# Patient Record
Sex: FEMALE | Race: AMERICAN INDIAN OR ALASKA NATIVE
[De-identification: names, ages, dates, MRNs, and addresses within clinical notes are randomized per-mention and may not be internally consistent; named-entity substitution may affect disease eponyms.]

---

## 2017-09-04 ENCOUNTER — HOSPITAL ENCOUNTER (EMERGENCY)
Dept: HOSPITAL 5 - ED | Age: 52
LOS: 1 days | Discharge: HOME | End: 2017-09-05
Payer: MEDICAID

## 2017-09-04 DIAGNOSIS — M54.2: ICD-10-CM
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
## 2017-09-04 DIAGNOSIS — Z88.6: ICD-10-CM
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
## 2017-09-04 DIAGNOSIS — M62.830: Primary | ICD-10-CM

## 2017-09-04 DIAGNOSIS — M25.562: ICD-10-CM

## 2017-09-04 DIAGNOSIS — I10: ICD-10-CM

## 2017-09-04 PROCEDURE — 99282 EMERGENCY DEPT VISIT SF MDM: CPT

## 2017-09-05 VITALS — DIASTOLIC BLOOD PRESSURE: 80 MMHG | SYSTOLIC BLOOD PRESSURE: 139 MMHG
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
## 2018-02-15 ENCOUNTER — HOSPITAL ENCOUNTER (INPATIENT)
Dept: HOSPITAL 5 - ED | Age: 53
LOS: 2 days | Discharge: PSYCHIATRIC OR SUBSTANCE USE FACILITY | DRG: 917 | End: 2018-02-17
Attending: INTERNAL MEDICINE | Admitting: INTERNAL MEDICINE
Payer: MEDICAID

## 2018-02-15 DIAGNOSIS — E87.6: ICD-10-CM

## 2018-02-15 DIAGNOSIS — T42.8X2A: Primary | ICD-10-CM

## 2018-02-15 DIAGNOSIS — T43.592A: ICD-10-CM

## 2018-02-15 DIAGNOSIS — F20.9: ICD-10-CM

## 2018-02-15 DIAGNOSIS — I10: ICD-10-CM

## 2018-02-15 DIAGNOSIS — Z90.49: ICD-10-CM

## 2018-02-15 DIAGNOSIS — J96.00: ICD-10-CM

## 2018-02-15 DIAGNOSIS — R40.1: ICD-10-CM

## 2018-02-15 DIAGNOSIS — Z88.6: ICD-10-CM

## 2018-02-15 LAB
ALBUMIN SERPL-MCNC: 3.5 G/DL (ref 3.9–5)
ALT SERPL-CCNC: 26 UNITS/L (ref 7–56)
APTT BLD: 30.5 SEC. (ref 24.2–36.6)
BASOPHILS # (AUTO): 0 K/MM3 (ref 0–0.1)
BASOPHILS NFR BLD AUTO: 0.8 % (ref 0–1.8)
BENZODIAZEPINES SCREEN,URINE: (no result)
BILIRUB UR QL STRIP: (no result)
BLOOD UR QL VISUAL: (no result)
BUN SERPL-MCNC: 13 MG/DL (ref 7–17)
BUN/CREAT SERPL: 26 %
CALCIUM SERPL-MCNC: 7.6 MG/DL (ref 8.4–10.2)
EOSINOPHIL # BLD AUTO: 0.2 K/MM3 (ref 0–0.4)
EOSINOPHIL NFR BLD AUTO: 4.3 % (ref 0–4.3)
HCT VFR BLD CALC: 32 % (ref 30.3–42.9)
HEMOLYSIS INDEX: 28
HGB BLD-MCNC: 10.9 GM/DL (ref 10.1–14.3)
INR PPP: 0.95 (ref 0.87–1.13)
LYMPHOCYTES # BLD AUTO: 1.8 K/MM3 (ref 1.2–5.4)
LYMPHOCYTES NFR BLD AUTO: 46.5 % (ref 13.4–35)
MAGNESIUM SERPL-MCNC: 2.1 MG/DL (ref 1.7–2.3)
MCH RBC QN AUTO: 33 PG (ref 28–32)
MCHC RBC AUTO-ENTMCNC: 34 % (ref 30–34)
MCV RBC AUTO: 98 FL (ref 79–97)
METHADONE SCREEN,URINE: (no result)
MONOCYTES # (AUTO): 0.5 K/MM3 (ref 0–0.8)
MONOCYTES % (AUTO): 12.2 % (ref 0–7.3)
NITRITE UR QL STRIP: (no result)
OPIATE SCREEN,URINE: (no result)
PH UR STRIP: 7 [PH] (ref 5–7)
PLATELET # BLD: 161 K/MM3 (ref 140–440)
PROT UR STRIP-MCNC: (no result) MG/DL
RBC # BLD AUTO: 3.27 M/MM3 (ref 3.65–5.03)
RBC #/AREA URNS HPF: 1 /HPF (ref 0–6)
UROBILINOGEN UR-MCNC: < 2 MG/DL (ref ?–2)
WBC #/AREA URNS HPF: < 1 /HPF (ref 0–6)

## 2018-02-15 PROCEDURE — 80048 BASIC METABOLIC PNL TOTAL CA: CPT

## 2018-02-15 PROCEDURE — 82550 ASSAY OF CK (CPK): CPT

## 2018-02-15 PROCEDURE — 87205 SMEAR GRAM STAIN: CPT

## 2018-02-15 PROCEDURE — 82553 CREATINE MB FRACTION: CPT

## 2018-02-15 PROCEDURE — 94002 VENT MGMT INPAT INIT DAY: CPT

## 2018-02-15 PROCEDURE — 4A033R1 MEASUREMENT OF ARTERIAL SATURATION, PERIPHERAL, PERCUTANEOUS APPROACH: ICD-10-PCS | Performed by: INTERNAL MEDICINE

## 2018-02-15 PROCEDURE — 93010 ELECTROCARDIOGRAM REPORT: CPT

## 2018-02-15 PROCEDURE — 70450 CT HEAD/BRAIN W/O DYE: CPT

## 2018-02-15 PROCEDURE — 80307 DRUG TEST PRSMV CHEM ANLYZR: CPT

## 2018-02-15 PROCEDURE — 87070 CULTURE OTHR SPECIMN AEROBIC: CPT

## 2018-02-15 PROCEDURE — 71045 X-RAY EXAM CHEST 1 VIEW: CPT

## 2018-02-15 PROCEDURE — 0BH17EZ INSERTION OF ENDOTRACHEAL AIRWAY INTO TRACHEA, VIA NATURAL OR ARTIFICIAL OPENING: ICD-10-PCS | Performed by: INTERNAL MEDICINE

## 2018-02-15 PROCEDURE — 85025 COMPLETE CBC W/AUTO DIFF WBC: CPT

## 2018-02-15 PROCEDURE — 93005 ELECTROCARDIOGRAM TRACING: CPT

## 2018-02-15 PROCEDURE — 81001 URINALYSIS AUTO W/SCOPE: CPT

## 2018-02-15 PROCEDURE — 82962 GLUCOSE BLOOD TEST: CPT

## 2018-02-15 PROCEDURE — 84100 ASSAY OF PHOSPHORUS: CPT

## 2018-02-15 PROCEDURE — 5A1935Z RESPIRATORY VENTILATION, LESS THAN 24 CONSECUTIVE HOURS: ICD-10-PCS | Performed by: INTERNAL MEDICINE

## 2018-02-15 PROCEDURE — 85730 THROMBOPLASTIN TIME PARTIAL: CPT

## 2018-02-15 PROCEDURE — 36600 WITHDRAWAL OF ARTERIAL BLOOD: CPT

## 2018-02-15 PROCEDURE — 94003 VENT MGMT INPAT SUBQ DAY: CPT

## 2018-02-15 PROCEDURE — 83036 HEMOGLOBIN GLYCOSYLATED A1C: CPT

## 2018-02-15 PROCEDURE — 96361 HYDRATE IV INFUSION ADD-ON: CPT

## 2018-02-15 PROCEDURE — 36415 COLL VENOUS BLD VENIPUNCTURE: CPT

## 2018-02-15 PROCEDURE — 83735 ASSAY OF MAGNESIUM: CPT

## 2018-02-15 PROCEDURE — 80053 COMPREHEN METABOLIC PANEL: CPT

## 2018-02-15 PROCEDURE — 85610 PROTHROMBIN TIME: CPT

## 2018-02-15 PROCEDURE — 84484 ASSAY OF TROPONIN QUANT: CPT

## 2018-02-15 PROCEDURE — 94640 AIRWAY INHALATION TREATMENT: CPT

## 2018-02-15 PROCEDURE — 96375 TX/PRO/DX INJ NEW DRUG ADDON: CPT

## 2018-02-15 PROCEDURE — G0480 DRUG TEST DEF 1-7 CLASSES: HCPCS

## 2018-02-15 PROCEDURE — 96374 THER/PROPH/DIAG INJ IV PUSH: CPT

## 2018-02-15 PROCEDURE — 80320 DRUG SCREEN QUANTALCOHOLS: CPT

## 2018-02-15 PROCEDURE — 82803 BLOOD GASES ANY COMBINATION: CPT

## 2018-02-15 NOTE — EMERGENCY DEPARTMENT REPORT
ED Psych HPI





- General


Stated Complaint: MH EVAL


Time Seen by Provider: 02/15/18 13:05


Source: EMS


Mode of arrival: Stretcher


Limitations: Altered Mental Status





- History of Present Illness


Initial Comments: 





52-year-old female with a past medical history of hypertension and 

schizophrenia presents to the hospital with intentional overdose.  EMS reports 

that patient was speaking to them upon arrival.  She states around noon she 

took a handful of Zanaflex and Seroquel.  Exact number of tablets unknown.  

Medication bottles available in the ED.  Seroquel 200 mg tablets filled 2/15 30 

tab now has 57 tabs in bottle. Zanaflex 4mg filled 2/6 30 tab now has 2 tablets 

in bottle pt should have about 20-21 tabs left in bottle.  Patient became more 

sedated a route to the hospital as per EMS despite receiving Narcan 2 mg.  

Patient also hypotensive.  IV fluids initiated prior to arrival.





Daughter arrives to the ED after intubation and states that her mother wanted 

her to cosign for a loan.  When her daughter refused she stated "I'm not going 

to need a loan where I am going" she took pills and informed her family member 

to call the ambulance if she does not wake up in one hour.  They called after 

she informed them that she took the pills.  He reported there were also pills 

on the floor in the home





- Related Data


 Home Medications











 Medication  Instructions  Recorded  Confirmed  Last Taken


 


Gabapentin [Neurontin] 300 mg PO Q8HR 02/15/18 02/15/18 Unknown


 


Losartan/Hydrochlorothiazide 1 each PO DAILY 02/15/18 02/15/18 Unknown





[Losartan-Hctz 50-12.5 mg Tab]    


 


QUEtiapine [SEROquel] 200 mg PO DAILY 02/15/18 02/15/18 Unknown


 


amLODIPine [Norvasc] 5 mg PO DAILY 02/15/18 02/15/18 Unknown


 


tiZANidine [Zanaflex] 4 mg PO DAILY 02/15/18 02/15/18 Unknown











 Allergies











Allergy/AdvReac Type Severity Reaction Status Date / Time


 


hydrocodone Allergy  Itching Verified 09/04/17 21:47














ED Review of Systems


ROS: 


Stated complaint: MH EVAL


Other details as noted in HPI








ED Past Medical Hx





- Past Medical History


Hx Hypertension: Yes





- Surgical History


Hx Cholecystectomy: Yes


Additional Surgical History: UFE,





- Social History


Smoking Status: Never Smoker


Substance Use Type: None





- Medications


Home Medications: 


 Home Medications











 Medication  Instructions  Recorded  Confirmed  Last Taken  Type


 


Gabapentin [Neurontin] 300 mg PO Q8HR 02/15/18 02/15/18 Unknown History


 


Losartan/Hydrochlorothiazide 1 each PO DAILY 02/15/18 02/15/18 Unknown History





[Losartan-Hctz 50-12.5 mg Tab]     


 


QUEtiapine [SEROquel] 200 mg PO DAILY 02/15/18 02/15/18 Unknown History


 


amLODIPine [Norvasc] 5 mg PO DAILY 02/15/18 02/15/18 Unknown History


 


tiZANidine [Zanaflex] 4 mg PO DAILY 02/15/18 02/15/18 Unknown History














ED Physical Exam





- Other


Other exam information: 





General: Limited by current mental status


Head exam: Atraumatic, normocephalic


Eyes exam: Pupils pinpoint and reactive


ENT: Moist mucous membrane


Neck exam: Normal inspection


Respiratory exam: Clear to auscultation bilateral, no wheezes, rales, crackles


Cardiovascular: Normal rate and rhythm


Abdomen: Soft, nondistended, and  nontender


Extremity: Full range of motion normal inspection no deformity


Back: Normal Inspection, full range of motion, no tenderness


Neurologic: Lethargic, no localization of withdrawal pain.  Patient does not 

open eyes to pain or voice.  No spontaneous movement.  With a GCS less than 8 

seconds intubation is warranted


Psychiatric: Unresponsive


Skin: Warm, dry, intact





ED Course


 Vital Signs











  02/15/18 02/15/18 02/15/18





  13:05 13:08 13:15


 


Temperature 98.1 F  


 


Pulse Rate 78 78 77


 


Respiratory  12 11 L





Rate   


 


Blood Pressure 89/54 85/49 91/53


 


O2 Sat by Pulse 100 99 100





Oximetry   














  02/15/18 02/15/18 02/15/18





  13:30 13:45 14:00


 


Temperature   


 


Pulse Rate 84 116 H 92 H


 


Respiratory 16 16 16





Rate   


 


Blood Pressure 106/63 107/59 99/61


 


O2 Sat by Pulse 100  





Oximetry   














  02/15/18 02/15/18 02/15/18





  14:15 14:30 14:45


 


Temperature   


 


Pulse Rate 104 H 94 H 90


 


Respiratory 16 16 16





Rate   


 


Blood Pressure 104/64 107/66 109/74


 


O2 Sat by Pulse 100 100 100





Oximetry   














  02/15/18 02/15/18 02/15/18





  15:00 15:15 15:30


 


Temperature   


 


Pulse Rate 88 90 117 H


 


Respiratory 16 16 16





Rate   


 


Blood Pressure 109/72 118/75 101/61


 


O2 Sat by Pulse  100 100





Oximetry   














  02/15/18 02/15/18 02/15/18





  15:45 16:00 16:15


 


Temperature   


 


Pulse Rate 95 H 107 H 99 H


 


Respiratory 16 16 16





Rate   


 


Blood Pressure 118/67 114/58 120/60


 


O2 Sat by Pulse 100 100 100





Oximetry   














  02/15/18 02/15/18





  16:30 16:53


 


Temperature  


 


Pulse Rate 99 H 110 H


 


Respiratory 16 16





Rate  


 


Blood Pressure 121/64 107/57


 


O2 Sat by Pulse  100





Oximetry  














- Reevaluation(s)


Reevaluation #1: 





02/15/18 18:02


Blood pressure remained stable after IV fluid bolus


Pt intubated without IV sedation and remains sedated at this time








- Consultations


Consultation #1: 





02/15/18 13:45


case d/w [place] with poison control


Seroquel can cause seizures recommended benzos as needed


Zanaflex can cause hypotension and respiratory depression.


Symptomatic treatment advised


Avoid antipsychotic (Haldol/Geodon)


Recommended 4 hour postingestion aspirin and Tylenol











- Intubation


Time Out Performed: Yes


Sedative: Etomidate


Paralytic: Succinylcholine


Laryngoscope: Esthela


Size: 3


ET Tube Size: 7.5


Tube Secured Depth (cm): 23


Tube Secured Location: lips


Tube Placement Confirmation: visualized tube passing t, equal breath sounds 

bilat, no breath sounds over epi, confirmation by capnometr


Patient Tolerated Procedure: well


Intubation Complications: none





ED Medical Decision Making





- Lab Data


Result diagrams: 


 02/15/18 13:50





 02/15/18 13:50








 Lab Results











  02/15/18 02/15/18 02/15/18 Range/Units





  13:48 13:48 13:50 


 


WBC    3.9 L  (4.5-11.0)  K/mm3


 


RBC    3.27 L  (3.65-5.03)  M/mm3


 


Hgb    10.9  (10.1-14.3)  gm/dl


 


Hct    32.0  (30.3-42.9)  %


 


MCV    98 H  (79-97)  fl


 


MCH    33 H  (28-32)  pg


 


MCHC    34  (30-34)  %


 


RDW    13.2  (13.2-15.2)  %


 


Plt Count    161  (140-440)  K/mm3


 


Lymph % (Auto)    46.5 H  (13.4-35.0)  %


 


Mono % (Auto)    12.2 H  (0.0-7.3)  %


 


Eos % (Auto)    4.3  (0.0-4.3)  %


 


Baso % (Auto)    0.8  (0.0-1.8)  %


 


Lymph #    1.8  (1.2-5.4)  K/mm3


 


Mono #    0.5  (0.0-0.8)  K/mm3


 


Eos #    0.2  (0.0-0.4)  K/mm3


 


Baso #    0.0  (0.0-0.1)  K/mm3


 


Seg Neutrophils %    36.2 L  (40.0-70.0)  %


 


Seg Neutrophils #    1.4 L  (1.8-7.7)  K/mm3


 


PT     (12.2-14.9)  Sec.


 


INR     (0.87-1.13)  


 


APTT     (24.2-36.6)  Sec.


 


POC ABG pH     (7.35-7.45)  


 


POC ABG pCO2     (35-45)  


 


POC ABG pO2     ()  


 


POC ABG HCO3     


 


POC ABG Total CO2     


 


POC ABG O2 Sat     


 


POC ABG Base Excess     


 


FiO2     %


 


Sodium     (137-145)  mmol/L


 


Potassium     (3.6-5.0)  mmol/L


 


Chloride     ()  mmol/L


 


Carbon Dioxide     (22-30)  mmol/L


 


Anion Gap     mmol/L


 


BUN     (7-17)  mg/dL


 


Creatinine     (0.7-1.2)  mg/dL


 


Estimated GFR     ml/min


 


BUN/Creatinine Ratio     %


 


Glucose     ()  mg/dL


 


Calcium     (8.4-10.2)  mg/dL


 


Magnesium     (1.7-2.3)  mg/dL


 


Total Bilirubin     (0.1-1.2)  mg/dL


 


AST     (5-40)  units/L


 


ALT     (7-56)  units/L


 


Alkaline Phosphatase     ()  units/L


 


Total Creatine Kinase     ()  units/L


 


CK-MB (CK-2)     (0.0-4.0)  ng/mL


 


CK-MB (CK-2) Rel Index     (0-4)  


 


Troponin T     (0.00-0.029)  ng/mL


 


Total Protein     (6.3-8.2)  g/dL


 


Albumin     (3.9-5)  g/dL


 


Albumin/Globulin Ratio     %


 


Urine Color  Yellow    (Yellow)  


 


Urine Turbidity  Clear    (Clear)  


 


Urine pH  7.0    (5.0-7.0)  


 


Ur Specific Gravity  1.008    (1.003-1.030)  


 


Urine Protein  <15 mg/dl    (Negative)  mg/dL


 


Urine Glucose (UA)  Neg    (Negative)  mg/dL


 


Urine Ketones  Neg    (Negative)  mg/dL


 


Urine Blood  Neg    (Negative)  


 


Urine Nitrite  Neg    (Negative)  


 


Urine Bilirubin  Neg    (Negative)  


 


Urine Urobilinogen  < 2.0    (<2.0)  mg/dL


 


Ur Leukocyte Esterase  Neg    (Negative)  


 


Urine WBC (Auto)  < 1.0    (0.0-6.0)  /HPF


 


Urine RBC (Auto)  1.0    (0.0-6.0)  /HPF


 


U Epithel Cells (Auto)  < 1.0    (0-13.0)  /HPF


 


Salicylates     (2.8-20.0)  mg/dL


 


Urine Opiates Screen   Presumptive negative   


 


Urine Methadone Screen   Presumptive negative   


 


Acetaminophen     (10.0-30.0)  ug/mL


 


Ur Barbiturates Screen   Presumptive negative   


 


Ur Phencyclidine Scrn   Presumptive negative   


 


Ur Amphetamines Screen   Presumptive negative   


 


U Benzodiazepines Scrn   Presumptive negative   


 


Urine Cocaine Screen   Presumptive negative   


 


U Marijuana (THC) Screen   Presumptive negative   


 


Drugs of Abuse Note   Disclamer   














  02/15/18 02/15/18 02/15/18 Range/Units





  13:50 13:50 13:50 


 


WBC     (4.5-11.0)  K/mm3


 


RBC     (3.65-5.03)  M/mm3


 


Hgb     (10.1-14.3)  gm/dl


 


Hct     (30.3-42.9)  %


 


MCV     (79-97)  fl


 


MCH     (28-32)  pg


 


MCHC     (30-34)  %


 


RDW     (13.2-15.2)  %


 


Plt Count     (140-440)  K/mm3


 


Lymph % (Auto)     (13.4-35.0)  %


 


Mono % (Auto)     (0.0-7.3)  %


 


Eos % (Auto)     (0.0-4.3)  %


 


Baso % (Auto)     (0.0-1.8)  %


 


Lymph #     (1.2-5.4)  K/mm3


 


Mono #     (0.0-0.8)  K/mm3


 


Eos #     (0.0-0.4)  K/mm3


 


Baso #     (0.0-0.1)  K/mm3


 


Seg Neutrophils %     (40.0-70.0)  %


 


Seg Neutrophils #     (1.8-7.7)  K/mm3


 


PT  13.2    (12.2-14.9)  Sec.


 


INR  0.95    (0.87-1.13)  


 


APTT  30.5    (24.2-36.6)  Sec.


 


POC ABG pH     (7.35-7.45)  


 


POC ABG pCO2     (35-45)  


 


POC ABG pO2     ()  


 


POC ABG HCO3     


 


POC ABG Total CO2     


 


POC ABG O2 Sat     


 


POC ABG Base Excess     


 


FiO2     %


 


Sodium   142   (137-145)  mmol/L


 


Potassium   3.2 L   (3.6-5.0)  mmol/L


 


Chloride   104.8   ()  mmol/L


 


Carbon Dioxide   23   (22-30)  mmol/L


 


Anion Gap   17   mmol/L


 


BUN   13   (7-17)  mg/dL


 


Creatinine   0.5 L   (0.7-1.2)  mg/dL


 


Estimated GFR   > 60   ml/min


 


BUN/Creatinine Ratio   26   %


 


Glucose   123 H   ()  mg/dL


 


Calcium   7.6 L   (8.4-10.2)  mg/dL


 


Magnesium   2.10   (1.7-2.3)  mg/dL


 


Total Bilirubin   0.30   (0.1-1.2)  mg/dL


 


AST   47 H   (5-40)  units/L


 


ALT   26   (7-56)  units/L


 


Alkaline Phosphatase   51   ()  units/L


 


Total Creatine Kinase   93   ()  units/L


 


CK-MB (CK-2)   < 1.0   (0.0-4.0)  ng/mL


 


CK-MB (CK-2) Rel Index   1.0   (0-4)  


 


Troponin T   < 0.010   (0.00-0.029)  ng/mL


 


Total Protein   5.7 L   (6.3-8.2)  g/dL


 


Albumin   3.5 L   (3.9-5)  g/dL


 


Albumin/Globulin Ratio   1.6   %


 


Urine Color     (Yellow)  


 


Urine Turbidity     (Clear)  


 


Urine pH     (5.0-7.0)  


 


Ur Specific Gravity     (1.003-1.030)  


 


Urine Protein     (Negative)  mg/dL


 


Urine Glucose (UA)     (Negative)  mg/dL


 


Urine Ketones     (Negative)  mg/dL


 


Urine Blood     (Negative)  


 


Urine Nitrite     (Negative)  


 


Urine Bilirubin     (Negative)  


 


Urine Urobilinogen     (<2.0)  mg/dL


 


Ur Leukocyte Esterase     (Negative)  


 


Urine WBC (Auto)     (0.0-6.0)  /HPF


 


Urine RBC (Auto)     (0.0-6.0)  /HPF


 


U Epithel Cells (Auto)     (0-13.0)  /HPF


 


Salicylates    < 0.3 L  (2.8-20.0)  mg/dL


 


Urine Opiates Screen     


 


Urine Methadone Screen     


 


Acetaminophen     (10.0-30.0)  ug/mL


 


Ur Barbiturates Screen     


 


Ur Phencyclidine Scrn     


 


Ur Amphetamines Screen     


 


U Benzodiazepines Scrn     


 


Urine Cocaine Screen     


 


U Marijuana (THC) Screen     


 


Drugs of Abuse Note     














  02/15/18 02/15/18 02/15/18 Range/Units





  15:33 15:33 15:37 


 


WBC     (4.5-11.0)  K/mm3


 


RBC     (3.65-5.03)  M/mm3


 


Hgb     (10.1-14.3)  gm/dl


 


Hct     (30.3-42.9)  %


 


MCV     (79-97)  fl


 


MCH     (28-32)  pg


 


MCHC     (30-34)  %


 


RDW     (13.2-15.2)  %


 


Plt Count     (140-440)  K/mm3


 


Lymph % (Auto)     (13.4-35.0)  %


 


Mono % (Auto)     (0.0-7.3)  %


 


Eos % (Auto)     (0.0-4.3)  %


 


Baso % (Auto)     (0.0-1.8)  %


 


Lymph #     (1.2-5.4)  K/mm3


 


Mono #     (0.0-0.8)  K/mm3


 


Eos #     (0.0-0.4)  K/mm3


 


Baso #     (0.0-0.1)  K/mm3


 


Seg Neutrophils %     (40.0-70.0)  %


 


Seg Neutrophils #     (1.8-7.7)  K/mm3


 


PT     (12.2-14.9)  Sec.


 


INR     (0.87-1.13)  


 


APTT     (24.2-36.6)  Sec.


 


POC ABG pH    7.410  (7.35-7.45)  


 


POC ABG pCO2    38.4  (35-45)  


 


POC ABG pO2    197 H  ()  


 


POC ABG HCO3    24.3  


 


POC ABG Total CO2    25  


 


POC ABG O2 Sat    100  


 


POC ABG Base Excess    0  


 


FiO2    50  %


 


Sodium     (137-145)  mmol/L


 


Potassium     (3.6-5.0)  mmol/L


 


Chloride     ()  mmol/L


 


Carbon Dioxide     (22-30)  mmol/L


 


Anion Gap     mmol/L


 


BUN     (7-17)  mg/dL


 


Creatinine     (0.7-1.2)  mg/dL


 


Estimated GFR     ml/min


 


BUN/Creatinine Ratio     %


 


Glucose     ()  mg/dL


 


Calcium     (8.4-10.2)  mg/dL


 


Magnesium     (1.7-2.3)  mg/dL


 


Total Bilirubin     (0.1-1.2)  mg/dL


 


AST     (5-40)  units/L


 


ALT     (7-56)  units/L


 


Alkaline Phosphatase     ()  units/L


 


Total Creatine Kinase     ()  units/L


 


CK-MB (CK-2)     (0.0-4.0)  ng/mL


 


CK-MB (CK-2) Rel Index     (0-4)  


 


Troponin T     (0.00-0.029)  ng/mL


 


Total Protein     (6.3-8.2)  g/dL


 


Albumin     (3.9-5)  g/dL


 


Albumin/Globulin Ratio     %


 


Urine Color     (Yellow)  


 


Urine Turbidity     (Clear)  


 


Urine pH     (5.0-7.0)  


 


Ur Specific Gravity     (1.003-1.030)  


 


Urine Protein     (Negative)  mg/dL


 


Urine Glucose (UA)     (Negative)  mg/dL


 


Urine Ketones     (Negative)  mg/dL


 


Urine Blood     (Negative)  


 


Urine Nitrite     (Negative)  


 


Urine Bilirubin     (Negative)  


 


Urine Urobilinogen     (<2.0)  mg/dL


 


Ur Leukocyte Esterase     (Negative)  


 


Urine WBC (Auto)     (0.0-6.0)  /HPF


 


Urine RBC (Auto)     (0.0-6.0)  /HPF


 


U Epithel Cells (Auto)     (0-13.0)  /HPF


 


Salicylates  < 0.3 L    (2.8-20.0)  mg/dL


 


Urine Opiates Screen     


 


Urine Methadone Screen     


 


Acetaminophen   15.0   (10.0-30.0)  ug/mL


 


Ur Barbiturates Screen     


 


Ur Phencyclidine Scrn     


 


Ur Amphetamines Screen     


 


U Benzodiazepines Scrn     


 


Urine Cocaine Screen     


 


U Marijuana (THC) Screen     


 


Drugs of Abuse Note     














- EKG Data


-: EKG Interpreted by Me


EKG shows normal: sinus rhythm, axis (40), intervals (qtc 463), QRS complexes (

81), ST-T waves (no stemi)


Rate: normal (97)





- EKG Data


When compared to previous EKG there are: previous EKG unavailable





- Radiology Data


Radiology results: report reviewed





Read by radiologist


Chest x-ray: Unremarkable AP chest.  ET tube terminates 3.8 cm superior to the 

bobbi





- Medical Decision Making





pt remains sedated


Ct head naf


no coingestions


Pt intubated for airway protection


Iv KCL ordered for mild hypokalemia


abg without acid base disturbance





Pt will be admitted for further treatment. Remains obtunded but BP stable





- Differential Diagnosis


overdose, suicide attempt, ICH, encephalopathy


Critical Care Time: No


Critical care attestation.: 


If time is entered above; I have spent that time in minutes in the direct care 

of this critically ill patient, excluding procedure time.








ED Disposition


Clinical Impression: 


 Suicide attempt by substance overdose, Obtunded, Hypokalemia





Disposition: DC-01 TO HOME OR SELFCARE


Is pt being admited?: Yes


Does the pt Need Aspirin: No


Condition: Fair


Time of Disposition: 18:06 (Dr Harrison/hosp)

## 2018-02-15 NOTE — HISTORY AND PHYSICAL REPORT
History of Present Illness


Date of examination: 02/15/18


Date of admission: 


02/15/18 18:55





Chief complaint: 





Intentional drug overdose / suicide attempt


History of present illness: 





52-year-old female with a past medical history of hypertension and 

schizophrenia presents to the hospital with intentional overdose.  EMS reports 

that patient was speaking to them upon arrival.  She states around noon she 

took a handful of Zanaflex and Seroquel.  Exact number of tablets unknown.  

Medication bottles available in the ED.  Seroquel 200 mg tablets filled 2/15 30 

tab now has 57 tabs in bottle. Zanaflex 4mg filled 2/6 30 tab now has 2 tablets 

in bottle pt should have about 20-21 tabs left in bottle.  Patient became more 

sedated a route to the hospital as per EMS despite receiving Narcan 2 mg.  

Patient also hypotensive.  IV fluids initiated prior to arrival.





Daughter arrives to the ED after intubation and states that her mother wanted 

her to cosign for a loan.  When her daughter refused she stated "I'm not going 

to need a loan where I am going" she took pills and informed her family member 

to call the ambulance if she does not wake up in one hour.  They called after 

she informed them that she took the pills.  He reported there were also pills 

on the floor in the home





Past History


Past Medical History: hypertension


Past Surgical History: cholecystectomy





Medications and Allergies


 Allergies











Allergy/AdvReac Type Severity Reaction Status Date / Time


 


hydrocodone Allergy  Itching Verified 09/04/17 21:47











 Home Medications











 Medication  Instructions  Recorded  Confirmed  Last Taken  Type


 


Gabapentin [Neurontin] 300 mg PO Q8HR 02/15/18 02/15/18 Unknown History


 


Losartan/Hydrochlorothiazide 1 each PO DAILY 02/15/18 02/15/18 Unknown History





[Losartan-Hctz 50-12.5 mg Tab]     


 


QUEtiapine [SEROquel] 200 mg PO DAILY 02/15/18 02/15/18 Unknown History


 


amLODIPine [Norvasc] 5 mg PO DAILY 02/15/18 02/15/18 Unknown History


 


tiZANidine [Zanaflex] 4 mg PO DAILY 02/15/18 02/15/18 Unknown History











Active Meds: 


Active Medications





Hydrophilic Ointment (Vaseline Lip Therapy)  1 applic TP Q2HR PRN


   PRN Reason: Dry Lips


Multi-Ingred Cream/Lotion/Oil/Oint (Artificial Tears Ophth Oint)  1 applic OU 

Q4HR PRN


   PRN Reason: Dry Eye(s)


Sodium Chloride (Nacl 0.9% 500 Ml)  1 ml IV AS DIRECT ZOLTAN


   Last Admin: 02/15/18 14:22 Dose:  1 ml











Review of Systems


ROS unobtainable: due to endotracheal tube





Exam





- Physical Exam


Narrative exam: 





General: the patient is intubated


HEENT: Pinpoint pupils patient has oral ET tube Head is atraumatic normocephalic

,. 


Neck: no JVD no thyromegaly or lymphadenopathy.  


Heart: Regular rate and rhythm no murmurs or gallops.  S1 and S2 normal.  PMI 

not displaced.


Lungs: On mechanical ventilation.  But otherwise clear to auscultation 

bilaterally.  No rales rhonchi wheezing.  


Abdomen: Soft, nondistended, and nontender.  Normoactive bowel sounds.  No 

hepatosplenomegaly.  No abdominal masses or bruit appreciated.


Extremities: No cyanosis/clubbing/ edema.


Musculoskeletal: No obvious deformity or swelling


Neurological: Limited exam.  Patient is sedated and intubated


Skin: Warm and dry no rashes or bruises.


Psychiatric: Could not be assessed due to chemical sedation


Vascular system: No lymphadenopathy.  Distal pulses 2+ bilaterally.





- Constitutional


Vitals: 


 











Temp Pulse Resp BP Pulse Ox


 


 98.1 F   95 H  16   115/72   100 


 


 02/15/18 13:05  02/15/18 21:12  02/15/18 19:30  02/15/18 21:12  02/15/18 21:12














Results





- Labs


CBC & Chem 7: 


 02/15/18 13:50





 02/15/18 13:50


Labs: 


 Laboratory Last Values











WBC  3.9 K/mm3 (4.5-11.0)  L  02/15/18  13:50    


 


RBC  3.27 M/mm3 (3.65-5.03)  L  02/15/18  13:50    


 


Hgb  10.9 gm/dl (10.1-14.3)   02/15/18  13:50    


 


Hct  32.0 % (30.3-42.9)   02/15/18  13:50    


 


MCV  98 fl (79-97)  H  02/15/18  13:50    


 


MCH  33 pg (28-32)  H  02/15/18  13:50    


 


MCHC  34 % (30-34)   02/15/18  13:50    


 


RDW  13.2 % (13.2-15.2)   02/15/18  13:50    


 


Plt Count  161 K/mm3 (140-440)   02/15/18  13:50    


 


Lymph % (Auto)  46.5 % (13.4-35.0)  H  02/15/18  13:50    


 


Mono % (Auto)  12.2 % (0.0-7.3)  H  02/15/18  13:50    


 


Eos % (Auto)  4.3 % (0.0-4.3)   02/15/18  13:50    


 


Baso % (Auto)  0.8 % (0.0-1.8)   02/15/18  13:50    


 


Lymph #  1.8 K/mm3 (1.2-5.4)   02/15/18  13:50    


 


Mono #  0.5 K/mm3 (0.0-0.8)   02/15/18  13:50    


 


Eos #  0.2 K/mm3 (0.0-0.4)   02/15/18  13:50    


 


Baso #  0.0 K/mm3 (0.0-0.1)   02/15/18  13:50    


 


Seg Neutrophils %  36.2 % (40.0-70.0)  L  02/15/18  13:50    


 


Seg Neutrophils #  1.4 K/mm3 (1.8-7.7)  L  02/15/18  13:50    


 


PT  13.2 Sec. (12.2-14.9)   02/15/18  13:50    


 


INR  0.95  (0.87-1.13)   02/15/18  13:50    


 


APTT  30.5 Sec. (24.2-36.6)   02/15/18  13:50    


 


POC ABG pH  7.410  (7.35-7.45)   02/15/18  15:37    


 


POC ABG pCO2  38.4  (35-45)   02/15/18  15:37    


 


POC ABG pO2  197  ()  H  02/15/18  15:37    


 


POC ABG HCO3  24.3   02/15/18  15:37    


 


POC ABG Total CO2  25   02/15/18  15:37    


 


POC ABG O2 Sat  100   02/15/18  15:37    


 


POC ABG Base Excess  0   02/15/18  15:37    


 


FiO2  50 %  02/15/18  15:37    


 


Sodium  142 mmol/L (137-145)   02/15/18  13:50    


 


Potassium  3.2 mmol/L (3.6-5.0)  L  02/15/18  13:50    


 


Chloride  104.8 mmol/L ()   02/15/18  13:50    


 


Carbon Dioxide  23 mmol/L (22-30)   02/15/18  13:50    


 


Anion Gap  17 mmol/L  02/15/18  13:50    


 


BUN  13 mg/dL (7-17)   02/15/18  13:50    


 


Creatinine  0.5 mg/dL (0.7-1.2)  L  02/15/18  13:50    


 


Estimated GFR  > 60 ml/min  02/15/18  13:50    


 


BUN/Creatinine Ratio  26 %  02/15/18  13:50    


 


Glucose  123 mg/dL ()  H  02/15/18  13:50    


 


Calcium  7.6 mg/dL (8.4-10.2)  L  02/15/18  13:50    


 


Magnesium  2.10 mg/dL (1.7-2.3)   02/15/18  13:50    


 


Total Bilirubin  0.30 mg/dL (0.1-1.2)   02/15/18  13:50    


 


AST  47 units/L (5-40)  H  02/15/18  13:50    


 


ALT  26 units/L (7-56)   02/15/18  13:50    


 


Alkaline Phosphatase  51 units/L ()   02/15/18  13:50    


 


Total Creatine Kinase  93 units/L ()   02/15/18  13:50    


 


CK-MB (CK-2)  < 1.0 ng/mL (0.0-4.0)   02/15/18  13:50    


 


CK-MB (CK-2) Rel Index  1.0  (0-4)   02/15/18  13:50    


 


Troponin T  < 0.010 ng/mL (0.00-0.029)   02/15/18  13:50    


 


Total Protein  5.7 g/dL (6.3-8.2)  L  02/15/18  13:50    


 


Albumin  3.5 g/dL (3.9-5)  L  02/15/18  13:50    


 


Albumin/Globulin Ratio  1.6 %  02/15/18  13:50    


 


Urine Color  Yellow  (Yellow)   02/15/18  13:48    


 


Urine Turbidity  Clear  (Clear)   02/15/18  13:48    


 


Urine pH  7.0  (5.0-7.0)   02/15/18  13:48    


 


Ur Specific Gravity  1.008  (1.003-1.030)   02/15/18  13:48    


 


Urine Protein  <15 mg/dl mg/dL (Negative)   02/15/18  13:48    


 


Urine Glucose (UA)  Neg mg/dL (Negative)   02/15/18  13:48    


 


Urine Ketones  Neg mg/dL (Negative)   02/15/18  13:48    


 


Urine Blood  Neg  (Negative)   02/15/18  13:48    


 


Urine Nitrite  Neg  (Negative)   02/15/18  13:48    


 


Urine Bilirubin  Neg  (Negative)   02/15/18  13:48    


 


Urine Urobilinogen  < 2.0 mg/dL (<2.0)   02/15/18  13:48    


 


Ur Leukocyte Esterase  Neg  (Negative)   02/15/18  13:48    


 


Urine WBC (Auto)  < 1.0 /HPF (0.0-6.0)   02/15/18  13:48    


 


Urine RBC (Auto)  1.0 /HPF (0.0-6.0)   02/15/18  13:48    


 


U Epithel Cells (Auto)  < 1.0 /HPF (0-13.0)   02/15/18  13:48    


 


Salicylates  < 0.3 mg/dL (2.8-20.0)  L  02/15/18  15:33    


 


Urine Opiates Screen  Presumptive negative   02/15/18  13:48    


 


Urine Methadone Screen  Presumptive negative   02/15/18  13:48    


 


Acetaminophen  < 15.0 ug/mL (10.0-30.0)   02/15/18  19:00    


 


Ur Barbiturates Screen  Presumptive negative   02/15/18  13:48    


 


Ur Phencyclidine Scrn  Presumptive negative   02/15/18  13:48    


 


Ur Amphetamines Screen  Presumptive negative   02/15/18  13:48    


 


U Benzodiazepines Scrn  Presumptive negative   02/15/18  13:48    


 


Urine Cocaine Screen  Presumptive negative   02/15/18  13:48    


 


U Marijuana (THC) Screen  Presumptive negative   02/15/18  13:48    


 


Drugs of Abuse Note  Disclamer   02/15/18  13:48    














- Imaging and Cardiology


EKG: other (EKG shows normal: sinus rhythm, axis (40), intervals (qtc 463), QRS 

complexes (81), ST-T waves (no stemi))


Imaging and Cardiology: 





X-ray showing no acute pulmonary process





Assessment and Plan


Assessment and plan: 





Assessment and plan - 


* Suicide attempt


* Intentional drug overdose


* Hypokalemia


* Hypertension





Plan - 


Admit to ICU 


continue mechanical ventilation and chemical sedation


Start patient on propofol drip


Mental health eval been medically stable


Blood pressures are currently stable monitor vital signs closely and give when 

necessary hydralazine if needed or indicated


Monitor CBC and electrolytes


Repeat his potassium and monitor


Replace all electrolytes when necessary as per protocol


DVT GI prophylaxis as ordered


Monitor and follow the patient closely

## 2018-02-15 NOTE — CAT SCAN REPORT
FINAL REPORT



EXAM:  CT HEAD/BRAIN WO CON



HISTORY:  ams, overdose 



TECHNIQUE:  CT examination of the head without IV contrast



PRIORS:  None.



FINDINGS:  

Slight mucosal thickening maxillary and ethmoid sinuses. No acute

air-fluid level visualized in the included air-filled sinuses. 



Bone windows demonstrate no acute fracture. 



The brain is without mass, mass effect, hemorrhage, or acute

infarct. There is no extra-axial intracranial bleed, brain bleed,

or midline shift. The ventricles and sulci are age-appropriate.







IMPRESSION:  

No acute CVA, intracranial bleed, or brain mass

## 2018-02-15 NOTE — XRAY REPORT
AP CHEST:



HISTORY: Endotracheal tube placement



The endotracheal tube terminates 3.8 cm superior to the bobbi. P view 

of the chest demonstrates a normal mediastinal and

cardiac contour with clear lungs and normal bony and soft tissue

structures.



IMPRESSION:

Unremarkable AP chest.

## 2018-02-16 LAB
ALBUMIN SERPL-MCNC: 3.9 G/DL (ref 3.9–5)
ALT SERPL-CCNC: 27 UNITS/L (ref 7–56)
BASOPHILS # (AUTO): 0 K/MM3 (ref 0–0.1)
BASOPHILS NFR BLD AUTO: 0.4 % (ref 0–1.8)
BUN SERPL-MCNC: 6 MG/DL (ref 7–17)
BUN/CREAT SERPL: 15 %
CALCIUM SERPL-MCNC: 7.7 MG/DL (ref 8.4–10.2)
EOSINOPHIL # BLD AUTO: 0.1 K/MM3 (ref 0–0.4)
EOSINOPHIL NFR BLD AUTO: 2 % (ref 0–4.3)
HCT VFR BLD CALC: 36.6 % (ref 30.3–42.9)
HEMOLYSIS INDEX: 14
HGB BLD-MCNC: 12.4 GM/DL (ref 10.1–14.3)
LYMPHOCYTES # BLD AUTO: 0.7 K/MM3 (ref 1.2–5.4)
LYMPHOCYTES NFR BLD AUTO: 11.9 % (ref 13.4–35)
MAGNESIUM SERPL-MCNC: 1.8 MG/DL (ref 1.7–2.3)
MCH RBC QN AUTO: 33 PG (ref 28–32)
MCHC RBC AUTO-ENTMCNC: 34 % (ref 30–34)
MCV RBC AUTO: 97 FL (ref 79–97)
MONOCYTES # (AUTO): 0.6 K/MM3 (ref 0–0.8)
MONOCYTES % (AUTO): 10.6 % (ref 0–7.3)
PLATELET # BLD: 161 K/MM3 (ref 140–440)
RBC # BLD AUTO: 3.79 M/MM3 (ref 3.65–5.03)

## 2018-02-16 RX ADMIN — IPRATROPIUM BROMIDE AND ALBUTEROL SULFATE SCH AMPUL: .5; 3 SOLUTION RESPIRATORY (INHALATION) at 01:16

## 2018-02-16 RX ADMIN — IPRATROPIUM BROMIDE AND ALBUTEROL SULFATE SCH AMPUL: .5; 3 SOLUTION RESPIRATORY (INHALATION) at 08:10

## 2018-02-16 NOTE — PROGRESS NOTE
Assessment and Plan


Assessment and plan: 


Suicidal attempt


Drug overdose


Respiratory distress


Altered mental status


- Currently patient is alert and oriented not in pulmonary distress


- Patient was extubated


- Pending psychiatric evaluation


DVT prophylaxis


- Lovenox


Disposition


- discharge after psychiatric evaluation.








History


Interval history: 


Patient was seen and evaluated this morning, patient was extubated this morning 

and not in pulmonary distress.








Hospitalist Physical





- Physical exam


Narrative exam: 


 Not in cardiopulmonary distress. 


 The patient appeared well nourished and normally developed.


 Vital signs as documented.


 Head exam is unremarkable.


 No scleral icterus .


 Neck is without jugular venous distension, thyromegaly, or carotid bruits. 


 Lungs are clear to auscultation.


Cardiac exam reveals regular rate and  Rhythm. First and second heart sounds 

normal. No murmurs, rubs or gallops. 


Abdominal exam reveals normal bowel sounds, no masses, no organomegaly and no 

aortic enlargement. 


Extremities are nonedematous and both femoral and pedal pulses are normal.


CNS: Alert and oriented 3.  No focal weakness.








- Constitutional


Vitals: 


 











Temp Pulse Resp BP Pulse Ox


 


 97.7 F   92 H  15   144/54   97 


 


 02/16/18 12:00  02/16/18 10:51  02/16/18 10:51  02/16/18 10:51  02/16/18 13:43














Results





- Labs


CBC & Chem 7: 


 02/16/18 02:45





 02/16/18 02:45


Labs: 


 Laboratory Last Values











WBC  5.5 K/mm3 (4.5-11.0)   02/16/18  02:45    


 


RBC  3.79 M/mm3 (3.65-5.03)   02/16/18  02:45    


 


Hgb  12.4 gm/dl (10.1-14.3)   02/16/18  02:45    


 


Hct  36.6 % (30.3-42.9)   02/16/18  02:45    


 


MCV  97 fl (79-97)   02/16/18  02:45    


 


MCH  33 pg (28-32)  H  02/16/18  02:45    


 


MCHC  34 % (30-34)   02/16/18  02:45    


 


RDW  13.6 % (13.2-15.2)   02/16/18  02:45    


 


Plt Count  161 K/mm3 (140-440)   02/16/18  02:45    


 


Lymph % (Auto)  11.9 % (13.4-35.0)  L  02/16/18  02:45    


 


Mono % (Auto)  10.6 % (0.0-7.3)  H  02/16/18  02:45    


 


Eos % (Auto)  2.0 % (0.0-4.3)   02/16/18  02:45    


 


Baso % (Auto)  0.4 % (0.0-1.8)   02/16/18  02:45    


 


Lymph #  0.7 K/mm3 (1.2-5.4)  L  02/16/18  02:45    


 


Mono #  0.6 K/mm3 (0.0-0.8)   02/16/18  02:45    


 


Eos #  0.1 K/mm3 (0.0-0.4)   02/16/18  02:45    


 


Baso #  0.0 K/mm3 (0.0-0.1)   02/16/18  02:45    


 


Seg Neutrophils %  75.1 % (40.0-70.0)  H  02/16/18  02:45    


 


Seg Neutrophils #  4.1 K/mm3 (1.8-7.7)   02/16/18  02:45    


 


PT  13.2 Sec. (12.2-14.9)   02/15/18  13:50    


 


INR  0.95  (0.87-1.13)   02/15/18  13:50    


 


APTT  30.5 Sec. (24.2-36.6)   02/15/18  13:50    


 


POC ABG pH  7.397  (7.35-7.45)   02/16/18  05:41    


 


POC ABG pCO2  43.5  (35-45)   02/16/18  05:41    


 


POC ABG pO2  147  ()  H  02/16/18  05:41    


 


POC ABG HCO3  26.8   02/16/18  05:41    


 


POC ABG Total CO2  28   02/16/18  05:41    


 


POC ABG O2 Sat  99   02/16/18  05:41    


 


POC ABG Base Excess  2   02/16/18  05:41    


 


FiO2  35 %  02/16/18  05:41    


 


Sodium  141 mmol/L (137-145)   02/16/18  02:45    


 


Potassium  3.5 mmol/L (3.6-5.0)  L  02/16/18  02:45    


 


Chloride  103.1 mmol/L ()   02/16/18  02:45    


 


Carbon Dioxide  23 mmol/L (22-30)   02/16/18  02:45    


 


Anion Gap  18 mmol/L  02/16/18  02:45    


 


BUN  6 mg/dL (7-17)  L  02/16/18  02:45    


 


Creatinine  0.4 mg/dL (0.7-1.2)  L  02/16/18  02:45    


 


Estimated GFR  > 60 ml/min  02/16/18  02:45    


 


BUN/Creatinine Ratio  15 %  02/16/18  02:45    


 


Glucose  115 mg/dL ()  H  02/16/18  02:45    


 


POC Glucose  116  ()  H  02/16/18  09:21    


 


Hemoglobin A1c  4.9 % (4-6)   02/16/18  02:45    


 


Calcium  7.7 mg/dL (8.4-10.2)  L  02/16/18  02:45    


 


Phosphorus  2.40 mg/dL (2.5-4.5)  L  02/16/18  04:59    


 


Magnesium  1.80 mg/dL (1.7-2.3)   02/16/18  04:59    


 


Total Bilirubin  0.40 mg/dL (0.1-1.2)   02/16/18  02:45    


 


AST  32 units/L (5-40)   02/16/18  02:45    


 


ALT  27 units/L (7-56)   02/16/18  02:45    


 


Alkaline Phosphatase  61 units/L ()   02/16/18  02:45    


 


Total Creatine Kinase  93 units/L ()   02/15/18  13:50    


 


CK-MB (CK-2)  < 1.0 ng/mL (0.0-4.0)   02/15/18  13:50    


 


CK-MB (CK-2) Rel Index  1.0  (0-4)   02/15/18  13:50    


 


Troponin T  < 0.010 ng/mL (0.00-0.029)   02/15/18  13:50    


 


Total Protein  6.2 g/dL (6.3-8.2)  L  02/16/18  02:45    


 


Albumin  3.9 g/dL (3.9-5)   02/16/18  02:45    


 


Albumin/Globulin Ratio  1.7 %  02/16/18  02:45    


 


Urine Color  Yellow  (Yellow)   02/15/18  13:48    


 


Urine Turbidity  Clear  (Clear)   02/15/18  13:48    


 


Urine pH  7.0  (5.0-7.0)   02/15/18  13:48    


 


Ur Specific Gravity  1.008  (1.003-1.030)   02/15/18  13:48    


 


Urine Protein  <15 mg/dl mg/dL (Negative)   02/15/18  13:48    


 


Urine Glucose (UA)  Neg mg/dL (Negative)   02/15/18  13:48    


 


Urine Ketones  Neg mg/dL (Negative)   02/15/18  13:48    


 


Urine Blood  Neg  (Negative)   02/15/18  13:48    


 


Urine Nitrite  Neg  (Negative)   02/15/18  13:48    


 


Urine Bilirubin  Neg  (Negative)   02/15/18  13:48    


 


Urine Urobilinogen  < 2.0 mg/dL (<2.0)   02/15/18  13:48    


 


Ur Leukocyte Esterase  Neg  (Negative)   02/15/18  13:48    


 


Urine WBC (Auto)  < 1.0 /HPF (0.0-6.0)   02/15/18  13:48    


 


Urine RBC (Auto)  1.0 /HPF (0.0-6.0)   02/15/18  13:48    


 


U Epithel Cells (Auto)  < 1.0 /HPF (0-13.0)   02/15/18  13:48    


 


Salicylates  < 0.3 mg/dL (2.8-20.0)  L  02/15/18  15:33    


 


Urine Opiates Screen  Presumptive negative   02/15/18  13:48    


 


Urine Methadone Screen  Presumptive negative   02/15/18  13:48    


 


Acetaminophen  < 15.0 ug/mL (10.0-30.0)   02/15/18  19:00    


 


Ur Barbiturates Screen  Presumptive negative   02/15/18  13:48    


 


Ur Phencyclidine Scrn  Presumptive negative   02/15/18  13:48    


 


Ur Amphetamines Screen  Presumptive negative   02/15/18  13:48    


 


U Benzodiazepines Scrn  Presumptive negative   02/15/18  13:48    


 


Urine Cocaine Screen  Presumptive negative   02/15/18  13:48    


 


U Marijuana (THC) Screen  Presumptive negative   02/15/18  13:48    


 


Drugs of Abuse Note  Disclamer   02/15/18  13:48

## 2018-02-16 NOTE — XRAY REPORT
FINAL REPORT



PROCEDURE:  XR CHEST 1V AP



TECHNIQUE:  Chest radiograph anteroposterior view. CPT 35631







HISTORY:  ETT placement 



COMPARISON:  No prior studies are available for comparison.



FINDINGS:  

Heart: Normal.



Mediastinum/Vessels: Normal.



Lungs/Pleural space: There is patchy bibasilar atelectasis or

infiltrate. No effusion or pneumothorax is seen..



Bony thorax: No acute osseous abnormality.



Life support devices: The endotracheal tube tip projects 3

centimeters superior to the bobbi. The nasogastric tube is

coiled in the stomach.



IMPRESSION:  

Endotracheal tube tip projects 3 centimeters superior to the

bobbi.

## 2018-02-16 NOTE — CONSULTATION
History of Present Illness


Consult date: 02/16/18


Requesting physician: GATO MANZO


Reason for consult: other (acute respiratory failure secondary to intentional 

overdose)


History of present illness: 





51 y/o female with psych history admitted with intentional overdose.  based on 

ED documentation patient was unresponsive and was intubated for airway 

protection.  later that evening patient was started on diprovan.  It is unclear 

based on the documentation as to why.  CXR clear and ABG is normal.  Patient 

brought up to CCU this am.  I asked nursing to discontinue diprovan.





Past History


Past Medical History: hypertension


Past Surgical History: cholecystectomy





Medications and Allergies


 Allergies











Allergy/AdvReac Type Severity Reaction Status Date / Time


 


hydrocodone Allergy  Itching Verified 09/04/17 21:47











 Home Medications











 Medication  Instructions  Recorded  Confirmed  Last Taken  Type


 


Gabapentin [Neurontin] 300 mg PO Q8HR 02/15/18 02/15/18 Unknown History


 


Losartan/Hydrochlorothiazide 1 each PO DAILY 02/15/18 02/15/18 Unknown History





[Losartan-Hctz 50-12.5 mg Tab]     


 


QUEtiapine [SEROquel] 200 mg PO DAILY 02/15/18 02/15/18 Unknown History


 


amLODIPine [Norvasc] 5 mg PO DAILY 02/15/18 02/15/18 Unknown History


 


tiZANidine [Zanaflex] 4 mg PO DAILY 02/15/18 02/15/18 Unknown History











Active Meds: 


Active Medications





Albuterol (Proventil)  2.5 mg IH Q3HRT PRN


   PRN Reason: Shortness Of Breath


Albuterol/Ipratropium (Duoneb *Not For Prn Use*)  1 ampul IH Q6HRT [facility]


   Last Admin: 02/16/18 08:10 Dose:  1 ampul


Dextrose (D50w (25gm) Syringe)  50 ml IV PRN PRN


   PRN Reason: Hypoglycemia


Enoxaparin Sodium (Lovenox)  40 mg SUB-Q QDAY ZOLTAN


Famotidine (Pepcid)  20 mg IV BID ZOLTAN


Hydrophilic Ointment (Vaseline Lip Therapy)  1 applic TP Q2HR PRN


   PRN Reason: Dry Lips


Dextrose/Sodium Chloride (D5ns)  1,000 mls @ 125 mls/hr IV AS DIRECT ZOLTAN


Propofol (Diprivan 10 Mg/Ml)  1,000 mg in 100 mls @ 3.13 mls/hr IV TITR ZOLTAN; 5 

MCG/KG/MIN


   PRN Reason: Protocol


   Last Titration: 02/16/18 08:21 Dose:  Infused


Multi-Ingred Cream/Lotion/Oil/Oint (Artificial Tears Ophth Oint)  1 applic OU 

Q4HR PRN


   PRN Reason: Dry Eye(s)


Sodium Chloride (Nacl 0.9% 500 Ml)  1 ml IV AS DIRECT ZOLTAN











Review of Systems


ROS unobtainable: due to endotracheal tube, due to mental status





Physical Examination


Vital signs: 


 Vital Signs











Temp Pulse BP Pulse Ox


 


 98.1 F   78   89/54   100 


 


 02/15/18 13:05  02/15/18 13:05  02/15/18 13:05  02/15/18 13:05











General appearance: no acute distress, comatose


Eyes: non-icteric


ENT: other (orally intubated and sedated)


Neck: supple


Effort: normal


Ascultation: Bilateral: clear


Percussion: Bilateral: not dull


Cardiovascular: regular rate and rhythm


Gastrointestinal: normoactive bowel sounds, soft, non-tender


Extremities: no cyanosis, no edema, pink and warm, pulses normal


unable to assess





Results





- Laboratory Findings


CBC and BMP: 


 02/16/18 02:45





 02/16/18 02:45


ABG











POC ABG pH  7.397  (7.35-7.45)   02/16/18  05:41    


 


POC ABG pCO2  43.5  (35-45)   02/16/18  05:41    


 


POC ABG pO2  147  ()  H  02/16/18  05:41    


 


POC ABG HCO3  26.8   02/16/18  05:41    


 


POC ABG Total CO2  28   02/16/18  05:41    


 


POC ABG O2 Sat  99   02/16/18  05:41    





PT/INR, D-dimer











PT  13.2 Sec. (12.2-14.9)   02/15/18  13:50    


 


INR  0.95  (0.87-1.13)   02/15/18  13:50    








Abnormal lab findings: 


 Abnormal Labs











  02/15/18 02/15/18 02/15/18





  13:50 13:50 13:50


 


WBC  3.9 L  


 


RBC  3.27 L  


 


MCV  98 H  


 


MCH  33 H  


 


Lymph % (Auto)  46.5 H  


 


Mono % (Auto)  12.2 H  


 


Lymph #   


 


Seg Neutrophils %  36.2 L  


 


Seg Neutrophils #  1.4 L  


 


POC ABG pO2   


 


Potassium   3.2 L 


 


BUN   


 


Creatinine   0.5 L 


 


Glucose   123 H 


 


Calcium   7.6 L 


 


Phosphorus   


 


AST   47 H 


 


Total Protein   5.7 L 


 


Albumin   3.5 L 


 


Salicylates    < 0.3 L














  02/15/18 02/15/18 02/16/18





  15:33 15:37 02:45


 


WBC   


 


RBC   


 


MCV   


 


MCH    33 H


 


Lymph % (Auto)    11.9 L


 


Mono % (Auto)    10.6 H


 


Lymph #    0.7 L


 


Seg Neutrophils %    75.1 H


 


Seg Neutrophils #   


 


POC ABG pO2   197 H 


 


Potassium   


 


BUN   


 


Creatinine   


 


Glucose   


 


Calcium   


 


Phosphorus   


 


AST   


 


Total Protein   


 


Albumin   


 


Salicylates  < 0.3 L  














  02/16/18 02/16/18 02/16/18





  02:45 04:59 05:41


 


WBC   


 


RBC   


 


MCV   


 


MCH   


 


Lymph % (Auto)   


 


Mono % (Auto)   


 


Lymph #   


 


Seg Neutrophils %   


 


Seg Neutrophils #   


 


POC ABG pO2    147 H


 


Potassium  3.5 L  


 


BUN  6 L  


 


Creatinine  0.4 L  


 


Glucose  115 H  


 


Calcium  7.7 L  


 


Phosphorus   2.40 L 


 


AST   


 


Total Protein  6.2 L  


 


Albumin   


 


Salicylates   














- Diagnostic Findings


Chest x-ray: image reviewed (clear)





Assessment and Plan





51 y/o female with acute respiratory failure secondary to intentional overdose, 

suicide attempt.





1.  Stop diprovan


2.  Extubate once awake


3.  Psych eval


4.  Bedside swallow


5.  Restart home meds.





CCT 31 minutes.

## 2018-02-17 VITALS — DIASTOLIC BLOOD PRESSURE: 63 MMHG | SYSTOLIC BLOOD PRESSURE: 117 MMHG

## 2018-02-17 LAB
BUN SERPL-MCNC: 10 MG/DL (ref 7–17)
BUN/CREAT SERPL: 20 %
CALCIUM SERPL-MCNC: 8.4 MG/DL (ref 8.4–10.2)
HEMOLYSIS INDEX: 11

## 2018-02-17 NOTE — DISCHARGE SUMMARY
Providers





- Providers


Date of Admission: 


02/15/18 18:55





Attending physician: 


DENISE PARSONS MD





 





02/15/18 13:27


Consult to Dietitian/Nutrition [CONS] Routine 


   Physician Instructions: 


   Reason For Exam: 


   Reason for Consult: Write/Manage Tube Feeding





02/15/18 18:55


Consult to Physician [CONS] Urgent 


   Consulting Provider: JENNIFER ASKEW


   Reason For Exam: intubated, overdose


   Notified:: n





02/16/18 09:56


Consult to Mental Health [CONS] Routine 


   Reason For Exam: overdose


   Place consult to:: psychiatry


   Notified:: YES


   Phone number called:: [phone]


   Time called:: 15:54











Primary care physician: 


PRIMARY CARE MD








Hospitalization


Reason for admission: Sucidal attempt


Condition: Stable


Pertinent studies: 


CT head no acute intracranial findings


Chest x-ray normal








Hospital course: 





52-year-old female with a past medical history of hypertension and 

schizophrenia presents to the hospital with intentional overdose.  EMS reports 

that patient was speaking to them upon arrival.  She states around noon she 

took a handful of Zanaflex and Seroquel.  Exact number of tablets unknown.  

Medication bottles available in the ED.  Seroquel 200 mg tablets filled 2/15 30 

tab now has 57 tabs in bottle. Zanaflex 4mg filled 2/6 30 tab now has 2 tablets 

in bottle pt should have about 20-21 tabs left in bottle.  Patient became more 

sedated a route to the hospital as per EMS despite receiving Narcan 2 mg.  

Patient also hypotensive.  IV fluids initiated prior to arrival.


  Patient was not able to take care of her airways and she was intubated and 

admitted to ICU.  In the morning patient woke up fully and she was extubated 

and transferred to the floor.  Patient denied any suicidal ideation, difficulty 

of breathing or any pain.  Psych was consulted and transferred her to inpatient 

psych facility.  Patient was hemodynamically stable at the time of discharge.


Disposition: [id number]/TX-[id number] HOSP/PSY UNIT


Time spent for discharge: 31 minutes





- Discharge Diagnoses


(1) Hypokalemia


Status: Acute   





(2) Obtunded


Status: Acute   





(3) Suicide attempt by substance overdose


Status: Acute   





Core Measure Documentation





- Palliative Care


Palliative Care/ Comfort Measures: Not Applicable





- Core Measures


Any of the following diagnoses?: none





Exam





- Physical Exam


Narrative exam: 


 Not in cardiopulmonary distress. 


 The patient appeared well nourished and normally developed.


 Vital signs as documented.


 Head exam is unremarkable.


 No scleral icterus .


 Neck is without jugular venous distension, thyromegaly, or carotid bruits. 


 Lungs are clear to auscultation.


Cardiac exam reveals regular rate and  Rhythm. First and second heart sounds 

normal. No murmurs, rubs or gallops. 


Abdominal exam reveals normal bowel sounds, no masses, no organomegaly and no 

aortic enlargement. 


Extremities are nonedematous and both femoral and pedal pulses are normal.


CNS: Alert and oriented 3.  No focal weakness.








- Constitutional


Vitals: 


 











Temp Pulse Resp BP Pulse Ox


 


 98.4 F   101 H  17   117/63   97 


 


 02/17/18 06:47  02/17/18 06:47  02/17/18 06:47  02/17/18 06:47  02/17/18 06:47














Plan


Activity: no restrictions


Weight Bearing Status: Full Weight Bearing


Diet: regular


Follow up with: 


PRIMARY CARE,MD [Primary Care Provider] - 7 Days

## 2018-02-17 NOTE — PROGRESS NOTE
Assessment and Plan





53 y/o female with acute respiratory failure secondary to intentional overdose, 

suicide attempt.





Will sign off.  Call if questions.  No acute respiratory issues that require 

any pulmonary follow up noted.











Subjective


Date of service: 02/17/18


Interval history: 





Successful transfer out of ICU. Stable on room air.





Objective


 Vital Signs - 12hr











  02/17/18





  06:47


 


Temperature 98.4 F


 


Pulse Rate 101 H


 


Respiratory 17





Rate 


 


Blood Pressure 117/63


 


O2 Sat by Pulse 97





Oximetry 











Constitutional: no acute distress, comatose


Eyes: non-icteric


ENT: other (orally intubated and sedated)


Neck: supple


Effort: normal


Ascultation: Bilateral: clear


Percussion: Bilateral: not dull


Cardiovascular: regular rate and rhythm


Gastrointestinal: normoactive bowel sounds, soft, non-tender


Extremities: no cyanosis, no edema, pink and warm, pulses normal


Neurologic: unable to assess


CBC and BMP: 


 02/16/18 02:45





 02/17/18 07:38


ABG, PT/INR, D-dimer: 


ABG











POC ABG pH  7.397  (7.35-7.45)   02/16/18  05:41    


 


POC ABG pCO2  43.5  (35-45)   02/16/18  05:41    


 


POC ABG pO2  147  ()  H  02/16/18  05:41    


 


POC ABG HCO3  26.8   02/16/18  05:41    


 


POC ABG Total CO2  28   02/16/18  05:41    


 


POC ABG O2 Sat  99   02/16/18  05:41    





PT/INR, D-dimer











PT  13.2 Sec. (12.2-14.9)   02/15/18  13:50    


 


INR  0.95  (0.87-1.13)   02/15/18  13:50    








Abnormal lab findings: 


 Abnormal Labs











  02/15/18 02/15/18 02/15/18





  13:50 13:50 13:50


 


WBC  3.9 L  


 


RBC  3.27 L  


 


MCV  98 H  


 


MCH  33 H  


 


Lymph % (Auto)  46.5 H  


 


Mono % (Auto)  12.2 H  


 


Lymph #   


 


Seg Neutrophils %  36.2 L  


 


Seg Neutrophils #  1.4 L  


 


POC ABG pO2   


 


Potassium   3.2 L 


 


BUN   


 


Creatinine   0.5 L 


 


Glucose   123 H 


 


POC Glucose   


 


Calcium   7.6 L 


 


Phosphorus   


 


AST   47 H 


 


Total Protein   5.7 L 


 


Albumin   3.5 L 


 


Salicylates    < 0.3 L














  02/15/18 02/15/18 02/16/18





  15:33 15:37 02:45


 


WBC   


 


RBC   


 


MCV   


 


MCH    33 H


 


Lymph % (Auto)    11.9 L


 


Mono % (Auto)    10.6 H


 


Lymph #    0.7 L


 


Seg Neutrophils %    75.1 H


 


Seg Neutrophils #   


 


POC ABG pO2   197 H 


 


Potassium   


 


BUN   


 


Creatinine   


 


Glucose   


 


POC Glucose   


 


Calcium   


 


Phosphorus   


 


AST   


 


Total Protein   


 


Albumin   


 


Salicylates  < 0.3 L  














  02/16/18 02/16/18 02/16/18





  02:45 04:59 05:41


 


WBC   


 


RBC   


 


MCV   


 


MCH   


 


Lymph % (Auto)   


 


Mono % (Auto)   


 


Lymph #   


 


Seg Neutrophils %   


 


Seg Neutrophils #   


 


POC ABG pO2    147 H


 


Potassium  3.5 L  


 


BUN  6 L  


 


Creatinine  0.4 L  


 


Glucose  115 H  


 


POC Glucose   


 


Calcium  7.7 L  


 


Phosphorus   2.40 L 


 


AST   


 


Total Protein  6.2 L  


 


Albumin   


 


Salicylates   














  02/16/18 02/17/18





  09:21 07:38


 


WBC  


 


RBC  


 


MCV  


 


MCH  


 


Lymph % (Auto)  


 


Mono % (Auto)  


 


Lymph #  


 


Seg Neutrophils %  


 


Seg Neutrophils #  


 


POC ABG pO2  


 


Potassium   3.5 L


 


BUN  


 


Creatinine   0.5 L


 


Glucose  


 


POC Glucose  116 H 


 


Calcium  


 


Phosphorus  


 


AST  


 


Total Protein  


 


Albumin  


 


Salicylates